# Patient Record
Sex: MALE | Race: WHITE | NOT HISPANIC OR LATINO | Employment: FULL TIME | URBAN - METROPOLITAN AREA
[De-identification: names, ages, dates, MRNs, and addresses within clinical notes are randomized per-mention and may not be internally consistent; named-entity substitution may affect disease eponyms.]

---

## 2017-12-31 ENCOUNTER — HOSPITAL ENCOUNTER (EMERGENCY)
Facility: HOSPITAL | Age: 32
Discharge: HOME | End: 2017-12-31
Attending: EMERGENCY MEDICINE | Admitting: EMERGENCY MEDICINE

## 2017-12-31 ENCOUNTER — APPOINTMENT (OUTPATIENT)
Dept: GENERAL RADIOLOGY | Facility: HOSPITAL | Age: 32
End: 2017-12-31

## 2017-12-31 VITALS
HEIGHT: 70 IN | RESPIRATION RATE: 16 BRPM | OXYGEN SATURATION: 96 % | DIASTOLIC BLOOD PRESSURE: 86 MMHG | HEART RATE: 73 BPM | BODY MASS INDEX: 22.9 KG/M2 | WEIGHT: 160 LBS | SYSTOLIC BLOOD PRESSURE: 132 MMHG | TEMPERATURE: 98.3 F

## 2017-12-31 DIAGNOSIS — M54.31 SCIATIC PAIN, RIGHT: Primary | ICD-10-CM

## 2017-12-31 PROCEDURE — 99283 EMERGENCY DEPT VISIT LOW MDM: CPT

## 2017-12-31 PROCEDURE — 96374 THER/PROPH/DIAG INJ IV PUSH: CPT

## 2017-12-31 PROCEDURE — 25010000002 METHYLPREDNISOLONE PER 125 MG: Performed by: EMERGENCY MEDICINE

## 2017-12-31 PROCEDURE — 72110 X-RAY EXAM L-2 SPINE 4/>VWS: CPT

## 2017-12-31 PROCEDURE — 96375 TX/PRO/DX INJ NEW DRUG ADDON: CPT

## 2017-12-31 RX ORDER — PREDNISONE 20 MG/1
20 TABLET ORAL 2 TIMES DAILY
Qty: 8 TABLET | Refills: 0 | Status: SHIPPED | OUTPATIENT
Start: 2017-12-31 | End: 2020-03-03

## 2017-12-31 RX ORDER — LORAZEPAM 1 MG/1
0.5 TABLET ORAL EVERY 6 HOURS PRN
Status: DISCONTINUED | OUTPATIENT
Start: 2017-12-31 | End: 2017-12-31 | Stop reason: HOSPADM

## 2017-12-31 RX ORDER — CYCLOBENZAPRINE HCL 10 MG
10 TABLET ORAL 3 TIMES DAILY PRN
Qty: 21 TABLET | Refills: 0 | Status: SHIPPED | OUTPATIENT
Start: 2017-12-31 | End: 2020-03-03

## 2017-12-31 RX ORDER — HYDROMORPHONE HYDROCHLORIDE 1 MG/ML
0.5 INJECTION, SOLUTION INTRAMUSCULAR; INTRAVENOUS; SUBCUTANEOUS ONCE
Status: COMPLETED | OUTPATIENT
Start: 2017-12-31 | End: 2017-12-31

## 2017-12-31 RX ORDER — METHYLPREDNISOLONE SODIUM SUCCINATE 125 MG/2ML
125 INJECTION, POWDER, LYOPHILIZED, FOR SOLUTION INTRAMUSCULAR; INTRAVENOUS ONCE
Status: COMPLETED | OUTPATIENT
Start: 2017-12-31 | End: 2017-12-31

## 2017-12-31 RX ORDER — SODIUM CHLORIDE 0.9 % (FLUSH) 0.9 %
10 SYRINGE (ML) INJECTION AS NEEDED
Status: DISCONTINUED | OUTPATIENT
Start: 2017-12-31 | End: 2017-12-31 | Stop reason: HOSPADM

## 2017-12-31 RX ADMIN — METHYLPREDNISOLONE SODIUM SUCCINATE 125 MG: 125 INJECTION, POWDER, FOR SOLUTION INTRAMUSCULAR; INTRAVENOUS at 04:55

## 2017-12-31 RX ADMIN — HYDROMORPHONE HYDROCHLORIDE 0.5 MG: 10 INJECTION INTRAMUSCULAR; INTRAVENOUS; SUBCUTANEOUS at 04:56

## 2017-12-31 RX ADMIN — LORAZEPAM 0.5 MG: 1 TABLET ORAL at 04:56

## 2018-01-01 ENCOUNTER — HOSPITAL ENCOUNTER (EMERGENCY)
Facility: HOSPITAL | Age: 33
Discharge: HOME | End: 2018-01-01
Attending: EMERGENCY MEDICINE | Admitting: EMERGENCY MEDICINE

## 2018-01-01 ENCOUNTER — APPOINTMENT (OUTPATIENT)
Dept: CT IMAGING | Facility: HOSPITAL | Age: 33
End: 2018-01-01

## 2018-01-01 VITALS
HEART RATE: 96 BPM | WEIGHT: 160 LBS | DIASTOLIC BLOOD PRESSURE: 90 MMHG | HEIGHT: 70 IN | BODY MASS INDEX: 22.9 KG/M2 | OXYGEN SATURATION: 99 % | SYSTOLIC BLOOD PRESSURE: 147 MMHG | RESPIRATION RATE: 16 BRPM | TEMPERATURE: 98.3 F

## 2018-01-01 DIAGNOSIS — M51.26 HERNIATED LUMBAR INTERVERTEBRAL DISC: Primary | ICD-10-CM

## 2018-01-01 PROCEDURE — 25010000002 HYDROMORPHONE PER 4 MG: Performed by: EMERGENCY MEDICINE

## 2018-01-01 PROCEDURE — 72131 CT LUMBAR SPINE W/O DYE: CPT

## 2018-01-01 PROCEDURE — 96372 THER/PROPH/DIAG INJ SC/IM: CPT

## 2018-01-01 PROCEDURE — 25010000002 PROMETHAZINE PER 50 MG: Performed by: EMERGENCY MEDICINE

## 2018-01-01 PROCEDURE — 99283 EMERGENCY DEPT VISIT LOW MDM: CPT

## 2018-01-01 RX ORDER — OXYCODONE AND ACETAMINOPHEN 10; 325 MG/1; MG/1
1 TABLET ORAL EVERY 4 HOURS PRN
Qty: 24 TABLET | Refills: 0 | Status: SHIPPED | OUTPATIENT
Start: 2018-01-01 | End: 2020-03-03

## 2018-01-01 RX ORDER — PROMETHAZINE HYDROCHLORIDE 25 MG/ML
25 INJECTION, SOLUTION INTRAMUSCULAR; INTRAVENOUS ONCE
Status: COMPLETED | OUTPATIENT
Start: 2018-01-01 | End: 2018-01-01

## 2018-01-01 RX ADMIN — HYDROMORPHONE HYDROCHLORIDE 2 MG: 2 INJECTION INTRAMUSCULAR; INTRAVENOUS; SUBCUTANEOUS at 15:32

## 2018-01-01 RX ADMIN — PROMETHAZINE HYDROCHLORIDE 25 MG: 25 INJECTION INTRAMUSCULAR; INTRAVENOUS at 15:32

## 2018-01-01 NOTE — ED TRIAGE NOTES
"Pt states that he has had the pain for a month and was seeing a chiropractor which was helping but he re-injured it this weekend. States he was seen here and diagnosed with sciatica. Complains that the pain has gotten worse. States \"I was reading up on it and I just don't think it's sciatica. It hurts too bad.\" Pt in NAD at time of triage, skin is PWD, and breathing is even and unlabored.   "

## 2018-01-01 NOTE — ED PROVIDER NOTES
" EMERGENCY DEPARTMENT ENCOUNTER    CHIEF COMPLAINT  Chief Complaint: Leg Pain  History given by: Patient  History limited by: none  Room Number: 52/52  PMD: No Known Provider      HPI:  Pt is a 32 y.o. male who presents complaining of right buttock that radiates down right leg, concentrates in right calf, and numbs foot. Pt has hx of sciatic pain but states \"this feels worse\". Pt states he is unable to get comfortable. Pt visited ED 2 days ago an was given Prednisone and Toradol shot, but returned to ED because pain had not improved.     Duration:  2 days  Onset: gradual  Timing: constant  Location: right buttock  Radiation: right leg  Quality: pain  Intensity/Severity: mild  Progression: unchanged  Associated Symptoms: none  Aggravating Factors: movement  Alleviating Factors: none  Previous Episodes: Pt has hx of sciatic pain.  Treatment before arrival: Pt was given Prednisone and Toradol 2 days ago, to no effect.     PAST MEDICAL HISTORY  Active Ambulatory Problems     Diagnosis Date Noted   • No Active Ambulatory Problems     Resolved Ambulatory Problems     Diagnosis Date Noted   • No Resolved Ambulatory Problems     No Additional Past Medical History       PAST SURGICAL HISTORY  Past Surgical History:   Procedure Laterality Date   • ENDOMETRIAL BIOPSY         FAMILY HISTORY  History reviewed. No pertinent family history.    SOCIAL HISTORY  Social History     Social History   • Marital status: Single     Spouse name: N/A   • Number of children: N/A   • Years of education: N/A     Occupational History   • Not on file.     Social History Main Topics   • Smoking status: Never Smoker   • Smokeless tobacco: Current User     Types: Chew   • Alcohol use Yes   • Drug use: No   • Sexual activity: Defer     Other Topics Concern   • Not on file     Social History Narrative   • No narrative on file       ALLERGIES  Shellfish-derived products    REVIEW OF SYSTEMS  Review of Systems   Constitutional: Negative for activity " change, appetite change and fever.   HENT: Negative for congestion and sore throat.    Eyes: Negative.    Respiratory: Negative for cough and shortness of breath.    Cardiovascular: Negative for chest pain and leg swelling.   Gastrointestinal: Negative for abdominal pain, diarrhea and vomiting.   Endocrine: Negative.    Genitourinary: Negative for decreased urine volume and dysuria.   Musculoskeletal: Positive for myalgias (Right buttock and leg). Negative for neck pain.   Skin: Negative for rash and wound.   Allergic/Immunologic: Negative.    Neurological: Negative for weakness, numbness and headaches.   Hematological: Negative.    Psychiatric/Behavioral: Negative.    All other systems reviewed and are negative.      PHYSICAL EXAM  ED Triage Vitals   Temp Heart Rate Resp BP SpO2   01/01/18 1252 01/01/18 1252 01/01/18 1252 01/01/18 1319 01/01/18 1252   98.3 °F (36.8 °C) 111 16 147/90 96 %      Temp src Heart Rate Source Patient Position BP Location FiO2 (%)   01/01/18 1252 01/01/18 1252 01/01/18 1319 -- --   Tympanic Monitor Standing         Physical Exam   Constitutional: He is oriented to person, place, and time and well-developed, well-nourished, and in no distress.   HENT:   Head: Normocephalic and atraumatic.   Eyes: EOM are normal. Pupils are equal, round, and reactive to light.   Neck: Normal range of motion. Neck supple.   Cardiovascular: Normal rate, regular rhythm and normal heart sounds.    Pulmonary/Chest: Effort normal and breath sounds normal. No respiratory distress.   Abdominal: Soft. There is no tenderness. There is no rebound and no guarding.   Musculoskeletal: Normal range of motion. He exhibits no edema.   Right buttock pain and leg, but pt is able to stand and ambulate. Pt has good strength and is neurovascularly intact in right leg   Neurological: He is alert and oriented to person, place, and time. He has normal sensation and normal strength.   Skin: Skin is warm and dry.   Psychiatric: Mood  and affect normal.   Nursing note and vitals reviewed.      LAB RESULTS  Lab Results (last 24 hours)     ** No results found for the last 24 hours. **          I ordered the above labs and reviewed the results    RADIOLOGY  CT Lumbar Spine Without Contrast   Preliminary Result   1. 1.9 x 1.2 x 1.6 cm soft tissue focus to the right of the midline at   L5-S1. Correlation with an MRI of the lumbar spine is recommended.   2. Mild L5-S1 degenerative disc disease.       Radiation dose reduction techniques were utilized, including automated   exposure control and exposure modulation based on body size.                   I ordered the above noted radiological studies. Interpreted by radiologist. Discussed with radiologist (Ruben). Reviewed by me in PACS.     Procedures      PROGRESS AND CONSULTS  ED Course     1516  CT Lumbar Spine ordered.  Dilaudid and Phenergan ordered for pain management.     1621  Call placed to Neurosurgery.     1640  Discussed pt's case with Dr. Mcdaniel (neurosurgery) who agreed with plan for steroids, rest, and pain control as well as out paitent follow up with PCP to get MRIs prior to meeting with him    1645  Pt rechecked and resting comfortably since pain medication. Discussed result of CT scan as well as plan for discharge with steroids and pain control to follow up with PCP for MRI as well as Dr. Mcdaniel outpatient.       MEDICAL DECISION MAKING  Results were reviewed/discussed with the patient and they were also made aware of online access. Pt also made aware that some labs, such as cultures, will not be resulted during ER visit and follow up with PMD is necessary.     MDM  Number of Diagnoses or Management Options     Amount and/or Complexity of Data Reviewed  Tests in the radiology section of CPT®: ordered and reviewed (CT Lumbar Spine - Acute herniated disk, R L5 - S1)  Discuss the patient with other providers: yes (Dr. Mcdaniel (neurosurgery))           DIAGNOSIS  Final diagnoses:   Herniated lumbar  intervertebral disc L5/S1       DISPOSITION  DISCHARGE    Patient discharged in stable condition.    Reviewed implications of results, diagnosis, meds, responsibility to follow up, warning signs and symptoms of possible worsening, potential complications and reasons to return to ER.    Patient/Family voiced understanding of above instructions.    Discussed plan for discharge, as there is no emergent indication for admission.  Pt/family is agreeable and understands need for follow up and repeat testing.  Pt is aware that discharge does not mean that nothing is wrong but it indicates no emergency is present that requires admission and they must continue care with follow-up as given below or physician of their choice.     FOLLOW-UP  Anirudh Mcdaniel IV, MD  [address] [address]  [phone]    In 1 week           Medication List      New Prescriptions          oxyCODONE-acetaminophen  MG per tablet   Commonly known as:  PERCOCET   Take 1 tablet by mouth Every 4 (Four) Hours As Needed for Moderate Pain .             Latest Documented Vital Signs:  As of 5:22 PM  BP- 147/90 HR- 111 Temp- 98.3 °F (36.8 °C) (Tympanic) O2 sat- 96%    --  Documentation assistance provided by chantell Olguin for Dr. Ovalle.  Information recorded by the scribe was done at my direction and has been verified and validated by me.            Noa Olguin  01/01/18 6962       Jose Enrique Ovalle MD  01/01/18 9887

## 2018-01-01 NOTE — ED NOTES
Pt c/o right lower back pain that started 3weeks ago. Pt reports doing lifting on Saturday and now has increased pain. Pt was seen here for same on Sunday. Pain radiates down right leg. Reports numbness tingling to right foot. No loss of bowel or bladder function     Kaia Herr RN  01/01/18 4699

## 2018-01-04 ENCOUNTER — TRANSCRIBE ORDERS (OUTPATIENT)
Dept: LAB | Facility: HOSPITAL | Age: 33
End: 2018-01-04

## 2018-01-04 ENCOUNTER — HOSPITAL ENCOUNTER (OUTPATIENT)
Dept: CARDIOLOGY | Facility: HOSPITAL | Age: 33
Discharge: HOME | End: 2018-01-04
Attending: ORTHOPAEDIC SURGERY | Admitting: ORTHOPAEDIC SURGERY

## 2018-01-04 ENCOUNTER — HOSPITAL ENCOUNTER (OUTPATIENT)
Dept: GENERAL RADIOLOGY | Facility: HOSPITAL | Age: 33
Discharge: HOME | End: 2018-01-04
Attending: ORTHOPAEDIC SURGERY

## 2018-01-04 ENCOUNTER — LAB (OUTPATIENT)
Dept: LAB | Facility: HOSPITAL | Age: 33
End: 2018-01-04
Attending: ORTHOPAEDIC SURGERY

## 2018-01-04 DIAGNOSIS — Z01.818 PRE-OP TESTING: ICD-10-CM

## 2018-01-04 DIAGNOSIS — Z98.890 S/P LUMBAR MICRODISCECTOMY: ICD-10-CM

## 2018-01-04 DIAGNOSIS — Z01.818 PRE-OP TESTING: Primary | ICD-10-CM

## 2018-01-04 DIAGNOSIS — Z01.811 PRE-OP CHEST EXAM: ICD-10-CM

## 2018-01-04 LAB
ALBUMIN SERPL-MCNC: 4.4 G/DL (ref 3.5–5.2)
ALBUMIN/GLOB SERPL: 1.6 G/DL
ALP SERPL-CCNC: 54 U/L (ref 39–117)
ALT SERPL W P-5'-P-CCNC: 18 U/L (ref 1–41)
ANION GAP SERPL CALCULATED.3IONS-SCNC: 12.2 MMOL/L
APTT PPP: 26.2 SECONDS (ref 22.7–35.4)
AST SERPL-CCNC: 17 U/L (ref 1–40)
BASOPHILS # BLD AUTO: 0.04 10*3/MM3 (ref 0–0.2)
BASOPHILS NFR BLD AUTO: 0.4 % (ref 0–1.5)
BILIRUB SERPL-MCNC: 0.8 MG/DL (ref 0.1–1.2)
BUN BLD-MCNC: 18 MG/DL (ref 6–20)
BUN/CREAT SERPL: 17.5 (ref 7–25)
CALCIUM SPEC-SCNC: 9.2 MG/DL (ref 8.6–10.5)
CHLORIDE SERPL-SCNC: 99 MMOL/L (ref 98–107)
CO2 SERPL-SCNC: 27.8 MMOL/L (ref 22–29)
CREAT BLD-MCNC: 1.03 MG/DL (ref 0.76–1.27)
DEPRECATED RDW RBC AUTO: 40.3 FL (ref 37–54)
EOSINOPHIL # BLD AUTO: 0.18 10*3/MM3 (ref 0–0.7)
EOSINOPHIL NFR BLD AUTO: 1.7 % (ref 0.3–6.2)
ERYTHROCYTE [DISTWIDTH] IN BLOOD BY AUTOMATED COUNT: 11.9 % (ref 11.5–14.5)
GFR SERPL CREATININE-BSD FRML MDRD: 85 ML/MIN/1.73
GLOBULIN UR ELPH-MCNC: 2.8 GM/DL
GLUCOSE BLD-MCNC: 88 MG/DL (ref 65–99)
HCT VFR BLD AUTO: 45.4 % (ref 40.4–52.2)
HGB BLD-MCNC: 15.7 G/DL (ref 13.7–17.6)
IMM GRANULOCYTES # BLD: 0.02 10*3/MM3 (ref 0–0.03)
IMM GRANULOCYTES NFR BLD: 0.2 % (ref 0–0.5)
INR PPP: 1.01 (ref 0.9–1.1)
LYMPHOCYTES # BLD AUTO: 3.41 10*3/MM3 (ref 0.9–4.8)
LYMPHOCYTES NFR BLD AUTO: 32.5 % (ref 19.6–45.3)
MCH RBC QN AUTO: 32.6 PG (ref 27–32.7)
MCHC RBC AUTO-ENTMCNC: 34.6 G/DL (ref 32.6–36.4)
MCV RBC AUTO: 94.2 FL (ref 79.8–96.2)
MONOCYTES # BLD AUTO: 0.64 10*3/MM3 (ref 0.2–1.2)
MONOCYTES NFR BLD AUTO: 6.1 % (ref 5–12)
NEUTROPHILS # BLD AUTO: 6.2 10*3/MM3 (ref 1.9–8.1)
NEUTROPHILS NFR BLD AUTO: 59.1 % (ref 42.7–76)
PLATELET # BLD AUTO: 299 10*3/MM3 (ref 140–500)
PMV BLD AUTO: 10.2 FL (ref 6–12)
POTASSIUM BLD-SCNC: 3.8 MMOL/L (ref 3.5–5.2)
PROT SERPL-MCNC: 7.2 G/DL (ref 6–8.5)
PROTHROMBIN TIME: 12.9 SECONDS (ref 11.7–14.2)
RBC # BLD AUTO: 4.82 10*6/MM3 (ref 4.6–6)
SODIUM BLD-SCNC: 139 MMOL/L (ref 136–145)
WBC NRBC COR # BLD: 10.49 10*3/MM3 (ref 4.5–10.7)

## 2018-01-04 PROCEDURE — 85610 PROTHROMBIN TIME: CPT

## 2018-01-04 PROCEDURE — 85730 THROMBOPLASTIN TIME PARTIAL: CPT

## 2018-01-04 PROCEDURE — 80053 COMPREHEN METABOLIC PANEL: CPT

## 2018-01-04 PROCEDURE — 36415 COLL VENOUS BLD VENIPUNCTURE: CPT

## 2018-01-04 PROCEDURE — 85025 COMPLETE CBC W/AUTO DIFF WBC: CPT

## 2018-01-04 PROCEDURE — 71046 X-RAY EXAM CHEST 2 VIEWS: CPT

## 2018-01-04 PROCEDURE — 93010 ELECTROCARDIOGRAM REPORT: CPT | Performed by: INTERNAL MEDICINE

## 2018-01-04 PROCEDURE — 93005 ELECTROCARDIOGRAM TRACING: CPT | Performed by: ORTHOPAEDIC SURGERY

## 2018-01-11 ENCOUNTER — TELEPHONE (OUTPATIENT)
Dept: NEUROSURGERY | Facility: CLINIC | Age: 33
End: 2018-01-11

## 2018-01-11 NOTE — TELEPHONE ENCOUNTER
----- Message from Umu Perez sent at 1/11/2018  9:12 AM EST -----  Regarding: FW: appt      ----- Message -----     From: TONY Dykes     Sent: 1/10/2018   3:13 PM       To: Umu Perez  Subject: appt                                             He has new pt appt with me on 1/17, but it looks like he is scheduled to have surgery with Dr. Cooper soon as there are preop tests in the system. Please find out if we need to cancel his appt.

## 2018-01-11 NOTE — TELEPHONE ENCOUNTER
I called and LVM for patient to call office back. We just need to confirm if he wants to cancel his appt due to being scheduled for a surgery with another provider for same issue.    We do not need to let referring know (as to being referred in by ER).

## 2019-04-30 ENCOUNTER — OFFICE VISIT (OUTPATIENT)
Dept: FAMILY MEDICINE CLINIC | Facility: CLINIC | Age: 34
End: 2019-04-30

## 2019-04-30 VITALS
BODY MASS INDEX: 23.82 KG/M2 | WEIGHT: 166.4 LBS | TEMPERATURE: 98.7 F | DIASTOLIC BLOOD PRESSURE: 90 MMHG | OXYGEN SATURATION: 99 % | SYSTOLIC BLOOD PRESSURE: 120 MMHG | HEIGHT: 70 IN | HEART RATE: 84 BPM | RESPIRATION RATE: 18 BRPM

## 2019-04-30 DIAGNOSIS — F17.220 CHEWING TOBACCO NICOTINE DEPENDENCE WITHOUT COMPLICATION: ICD-10-CM

## 2019-04-30 DIAGNOSIS — M79.2 NEUROPATHIC PAIN OF RIGHT LOWER EXTREMITY: Primary | ICD-10-CM

## 2019-04-30 DIAGNOSIS — H02.9 EYELID ABNORMALITY: ICD-10-CM

## 2019-04-30 PROBLEM — J30.9 ALLERGIC RHINITIS: Status: ACTIVE | Noted: 2019-04-30

## 2019-04-30 PROCEDURE — 99214 OFFICE O/P EST MOD 30 MIN: CPT | Performed by: FAMILY MEDICINE

## 2019-04-30 PROCEDURE — 99406 BEHAV CHNG SMOKING 3-10 MIN: CPT | Performed by: FAMILY MEDICINE

## 2019-04-30 RX ORDER — BUPROPION HYDROCHLORIDE 150 MG/1
150 TABLET, EXTENDED RELEASE ORAL DAILY
Refills: 3 | COMMUNITY
Start: 2019-04-01 | End: 2019-04-30 | Stop reason: SINTOL

## 2019-04-30 RX ORDER — BETAMETHASONE DIPROPIONATE 0.5 MG/G
OINTMENT TOPICAL
Refills: 1 | COMMUNITY
Start: 2019-02-22 | End: 2019-04-30

## 2019-04-30 RX ORDER — VITAMIN B COMPLEX
CAPSULE ORAL DAILY
COMMUNITY
End: 2020-07-14

## 2019-04-30 RX ORDER — CETIRIZINE HYDROCHLORIDE 10 MG/1
10 TABLET ORAL DAILY
COMMUNITY

## 2019-04-30 NOTE — PROGRESS NOTES
"Chief Complaint   Patient presents with   • Establish Care     NP to [facility]    • Consult     opthal. referral    • Numbness     right outside of leg and foot, happened after back surgery     Previously seen by no one in 4 years. Pt is new to me, problems are new to me.      Subjective   Dylon Hedrick is a 34 y.o. male.     History of Present Illness   Lower leg and foot still with numbness   He had back surgery. Dr. Cooper, 1/2018. Surgery in [place], surgical center.   Balance is off, no falls, just with thte right side feels like he is not as balances. Feels like his leg is \"asleep\" but that is constant.   He has tried massage, PT, stretching. Wondering about acupuncture.   He is not in pain.  He followed up about one year ago and was having numbness, told it will return and still has not. Feels there is no change. No back pain occasional tightness depending on activity level.    No PCP in 4 years.  Moved back to [place] after going to [place] for school.     Skin tag on eyelid  Discussed with derm, [facility]. [facility] [place].   Recommended ophtho related to the area  Thought it was a scab but he said it is attached to the lid  Has been there for about one year.     He has bupropion related to smoking cessation but did not tolerate it. Trying to quit dip. Started when he played baseball, 19 yo. Has tried patches and gum. He did it cold turkey. Had one hiccup but back on the cessation.      The following portions of the patient's history were reviewed and updated as appropriate: allergies, current medications, past family history, past medical history, past social history, past surgical history and problem list.    Review of Systems   Constitutional: Negative for activity change, appetite change and unexpected weight change.   Respiratory: Negative for shortness of breath.    Cardiovascular: Negative for chest pain and leg swelling.   Gastrointestinal: Negative for blood in stool, " "constipation and diarrhea.       Vitals:    04/30/19 1519   BP: 120/90   BP Location: Left arm   Patient Position: Sitting   Cuff Size: Adult   Pulse: 84   Resp: 18   Temp: 98.7 °F (37.1 °C)   TempSrc: Oral   SpO2: 99%   Weight: 75.5 kg (166 lb 6.4 oz)   Height: 177.8 cm (70\")       Objective   Physical Exam   Constitutional: He is oriented to person, place, and time. He appears well-nourished. No distress.   HENT:   Right Ear: External ear normal.   Left Ear: External ear normal.   Nose: Nose normal.   Mouth/Throat: Oropharynx is clear and moist. No oropharyngeal exudate.   Bilateral ear canals and tympanic membranes appear normal.   Eyes: Conjunctivae and EOM are normal. Right eye exhibits no discharge. Left eye exhibits no discharge. No scleral icterus.   Neck: Neck supple. No thyromegaly present.   Cardiovascular: Normal rate, regular rhythm, normal heart sounds and intact distal pulses. Exam reveals no gallop and no friction rub.   No murmur heard.  Pulmonary/Chest: Effort normal and breath sounds normal. No respiratory distress. He has no wheezes. He has no rales. He exhibits no tenderness.   Abdominal: Soft. Bowel sounds are normal. He exhibits no distension and no mass. There is no tenderness. There is no guarding.   Musculoskeletal: He exhibits no edema or deformity.   Lymphadenopathy:     He has no cervical adenopathy.   Neurological: He is alert and oriented to person, place, and time. He exhibits normal muscle tone. Coordination normal.   Skin: Skin is warm and dry.   Psychiatric: He has a normal mood and affect. His behavior is normal.   Vitals reviewed.      Assessment/Plan   Dylon was seen today for establish care, consult and numbness.    Diagnoses and all orders for this visit:    Neuropathic pain of right lower extremity    Eyelid abnormality    Chewing tobacco nicotine dependence without complication    Will send to [facility] after we get records from surgery, Dr. Cooper in [place], now with [facility]. Given " his persistent symptoms and never having follow up imaging pt is concerned for the continued numbness. When he had the herniated disc per his description of symptoms and report he gives of surgeon's comments sounds like a ruptured disc with extrusion transitioned to sequestered. He had severe back pain, had imaging, scheduled for surgery one week later and day prior to surgery back pain improved and had the numbness of the whole lateral aspect of the right leg down to the 5th digit on the right foot. Has been about 15 months since his surgery (1/8/18). Encouraged him to be active. He is going to start getting back into running slowly and see how it goes.      Eye lid skin lesion, needs referral. Derm counseled regarding concerning lesions of the eyelid, rare but needed closer look, will refer. He does have two separate lower lid papular lesions, medial lesion is hyperpigmented and lateral lesion is almost clear/cystic in appearance. They are 1-2 mm, do not interfere with lid closure. No pain. No abnormal changes like easy bleeding or irritating/itching.    The patient was counseled on the short-term and long-term benefits of smoking cessation. 5 minutes was spent discussing methods used to increase the likelihood of successfully quitting tobacco use including oral medications, nicotine replacement, choosing a quit date, developing new habits.  Working on tobacco cessation but he has used again. He has already cut back significantly. He has not tolerated medications and NRT to assist him so working on his own. Motivated by upcoming wedding and wanting to be more healthy and in control. Feels the habit dictates what he does. He has been seen by dentist and overall has healthy gingival tissues. On exam no teeth stain, gingival recession or discoloration. Healthy appearing oral mucosa. Encouraged to continue.

## 2019-05-02 ENCOUNTER — OFFICE VISIT (OUTPATIENT)
Dept: FAMILY MEDICINE CLINIC | Facility: CLINIC | Age: 34
End: 2019-05-02

## 2019-05-02 VITALS
HEIGHT: 70 IN | WEIGHT: 162.6 LBS | TEMPERATURE: 98.4 F | RESPIRATION RATE: 19 BRPM | HEART RATE: 89 BPM | OXYGEN SATURATION: 98 % | BODY MASS INDEX: 23.28 KG/M2 | SYSTOLIC BLOOD PRESSURE: 116 MMHG | DIASTOLIC BLOOD PRESSURE: 78 MMHG

## 2019-05-02 DIAGNOSIS — J02.9 SORE THROAT: Primary | ICD-10-CM

## 2019-05-02 DIAGNOSIS — B34.9 VIRAL ILLNESS: ICD-10-CM

## 2019-05-02 LAB
EXPIRATION DATE: NORMAL
INTERNAL CONTROL: NORMAL
Lab: NORMAL
S PYO AG THROAT QL: NEGATIVE

## 2019-05-02 PROCEDURE — 99213 OFFICE O/P EST LOW 20 MIN: CPT | Performed by: FAMILY MEDICINE

## 2019-05-02 PROCEDURE — 87880 STREP A ASSAY W/OPTIC: CPT | Performed by: FAMILY MEDICINE

## 2019-05-02 RX ORDER — IPRATROPIUM BROMIDE 42 UG/1
2 SPRAY, METERED NASAL 4 TIMES DAILY
Qty: 15 ML | Refills: 0 | Status: SHIPPED | OUTPATIENT
Start: 2019-05-02 | End: 2020-03-03

## 2019-05-02 NOTE — PROGRESS NOTES
"Chief Complaint   Patient presents with   • Sore Throat     2 days        Subjective   Dylon Hedrick is a 34 y.o. male.     History of Present Illness   Sore throat  Feels like swallowing razor blades. Some cough but mild. Productive of small mucous.  Woke up yesterday morning with mild sore throat.  He is been pushing the fluids and did extra vitamin C.  Over last night into this morning he was awakened with a sore throat pain.  Very painful when he swallows.  He continues to push the fluids and has menthol lozenges for relief.  His fiancée woke up sick with sore throat today as well.  Wondering what he can do for the pain concern for strep throat.    The following portions of the patient's history were reviewed and updated as appropriate: allergies, current medications and problem list.    Review of Systems   HENT: Positive for sore throat.    Respiratory: Positive for cough.    Genitourinary: Negative.        Vitals:    05/02/19 1350   BP: 116/78   BP Location: Right arm   Patient Position: Sitting   Cuff Size: Adult   Pulse: 89   Resp: 19   Temp: 98.4 °F (36.9 °C)   TempSrc: Oral   SpO2: 98%   Weight: 73.8 kg (162 lb 9.6 oz)   Height: 177.8 cm (70\")       Objective   Physical Exam   Constitutional: He is oriented to person, place, and time. He appears well-nourished. No distress.   HENT:   Mouth/Throat: Oropharynx is clear and moist. No oropharyngeal exudate.   Erythematous and edematous nasal turbinates.   Eyes: Conjunctivae are normal. Right eye exhibits no discharge. Left eye exhibits no discharge. No scleral icterus.   Cardiovascular: Normal rate, regular rhythm, normal heart sounds and intact distal pulses. Exam reveals no gallop and no friction rub.   No murmur heard.  Pulmonary/Chest: Effort normal and breath sounds normal. No respiratory distress. He has no wheezes.   Neurological: He is alert and oriented to person, place, and time.   Psychiatric: He has a normal mood and affect. His behavior is " normal.   Vitals reviewed.      Assessment/Plan   Dylon was seen today for sore throat.    Diagnoses and all orders for this visit:    Sore throat  -     POCT rapid strep A    Viral illness    Other orders  -     chlorpheniramine (CHLOR-TRIMETON) 2 MG/5ML syrup; Take 5 mL by mouth Every 4 (Four) Hours As Needed for Allergies.  -     ipratropium (ATROVENT) 0.06 % nasal spray; 2 sprays into the nostril(s) as directed by provider 4 (Four) Times a Day.      Patient with likely viral illness sore throat and mild cough.  We will do symptom relief at this time.  He can take ibuprofen with food and plenty of water for pain.  He can use the lozenges he is using will prescribe a spray and also do nasal ipratropium to reduce number of days of illness.  He should call if he is not improving.

## 2020-03-03 ENCOUNTER — OFFICE VISIT (OUTPATIENT)
Dept: GASTROENTEROLOGY | Facility: CLINIC | Age: 35
End: 2020-03-03

## 2020-03-03 VITALS
TEMPERATURE: 98.4 F | SYSTOLIC BLOOD PRESSURE: 112 MMHG | DIASTOLIC BLOOD PRESSURE: 68 MMHG | BODY MASS INDEX: 23.94 KG/M2 | WEIGHT: 167.2 LBS | HEIGHT: 70 IN

## 2020-03-03 DIAGNOSIS — R09.89 CHRONIC THROAT CLEARING: ICD-10-CM

## 2020-03-03 DIAGNOSIS — R13.19 ESOPHAGEAL DYSPHAGIA: Primary | ICD-10-CM

## 2020-03-03 PROCEDURE — 99203 OFFICE O/P NEW LOW 30 MIN: CPT | Performed by: INTERNAL MEDICINE

## 2020-03-03 RX ORDER — FAMOTIDINE 40 MG/1
40 TABLET, FILM COATED ORAL DAILY
Qty: 30 TABLET | Refills: 3 | Status: SHIPPED | OUTPATIENT
Start: 2020-03-03 | End: 2020-06-02 | Stop reason: HOSPADM

## 2020-03-03 NOTE — PROGRESS NOTES
Subjective   Chief Complaint   Patient presents with   • Difficulty Swallowing   • Heartburn       Adal Hedrick is a  34 y.o. male here for an initial visit for heartburn and difficulty swallowing.  These problems are new to me.    C/o constant throat clearing - loses voice quickly - recurrent laryngitis.  Occasional heartburn.  Reports intermittent difficulty swallowing dense foods - carrots, steaks etc.  This has been going on for years.  Weight stable.  Swallowing issues are intermittent.  No chest pain.  He has seasonal allergies and has sinus issues.  Throat clearing seems to worsen after lunch.  He reports a negative ENT work-up.      Answers for HPI/ROS submitted by the patient on 2/25/2020   What is the primary reason for your visit?: Other  Please describe your symptoms.: Constant hoarseness of voice for last 5 plus years. Breathy forceful voice and constant clearing of throat. Trouble swallowing certain foods that seem to get stuck in esophagus.  Have you had these symptoms before?: Yes  How long have you been having these symptoms?: Other  Please describe any probable cause for these symptoms. : Maybe chewing tobacco or exposure to certain chemicals [place] golf [place].       HPI  History reviewed. No pertinent past medical history.  Past Surgical History:   Procedure Laterality Date   • BACK SURGERY     • BACK SURGERY  01/2018   • ENDOMETRIAL BIOPSY     • MOLE REMOVAL         Current Outpatient Medications:   •  cetirizine (zyrTEC) 10 MG tablet, Take 10 mg by mouth Daily., Disp: , Rfl:   •  B Complex Vitamins (VITAMIN B COMPLEX) capsule capsule, Take  by mouth Daily., Disp: , Rfl:   •  famotidine (PEPCID) 40 MG tablet, Take 1 tablet by mouth Daily., Disp: 30 tablet, Rfl: 3  •  ibuprofen (ADVIL,MOTRIN) 200 MG tablet, Take 200 mg by mouth Every 6 (Six) Hours As Needed for Mild Pain ., Disp: , Rfl:   PRN Meds:.  Allergies   Allergen Reactions   • Shellfish-Derived Products      Social History      Socioeconomic History   • Marital status:      Spouse name: Not on file   • Number of children: Not on file   • Years of education: Not on file   • Highest education level: Not on file   Tobacco Use   • Smoking status: Never Smoker   • Smokeless tobacco: Current User     Types: Chew   Substance and Sexual Activity   • Alcohol use: Yes     Frequency: 2-4 times a month     Binge frequency: Monthly   • Drug use: No   • Sexual activity: Defer   Social History Narrative    ** Merged History Encounter **          Family History   Problem Relation Age of Onset   • No Known Problems Mother    • No Known Problems Father    • No Known Problems Sister    • No Known Problems Brother    • Lung cancer Maternal Grandfather    • Pancreatic cancer Maternal Aunt    • Colon cancer Neg Hx    • Prostate cancer Neg Hx      Review of Systems   Constitutional: Negative for appetite change and unexpected weight change.   HENT: Positive for trouble swallowing and voice change.    Gastrointestinal: Negative for abdominal pain and blood in stool.   All other systems reviewed and are negative.    Vitals:    03/03/20 1604   BP: 112/68   Temp: 98.4 °F (36.9 °C)         03/03/20  1604   Weight: 75.8 kg (167 lb 3.2 oz)       Objective   Physical Exam   Constitutional: He appears well-developed and well-nourished.   HENT:   Head: Normocephalic and atraumatic.   Eyes: No scleral icterus.   Pulmonary/Chest: Effort normal.   Abdominal: Soft. He exhibits no distension.   Neurological: He is alert.   Skin: Skin is warm and dry.   Psychiatric: He has a normal mood and affect.     No radiology results for the last 7 days    Assessment/Plan   Diagnoses and all orders for this visit:    Esophageal dysphagia  -     Case Request; Standing  -     Case Request    Chronic throat clearing  -     Case Request; Standing  -     Case Request    Other orders  -     Follow Anesthesia Guidelines / Protocol; Future  -     Obtain Informed Consent; Future  -      Implement Anesthesia orders day of procedure.; Standing  -     Obtain informed consent; Standing  -     famotidine (PEPCID) 40 MG tablet; Take 1 tablet by mouth Daily.      Plan:  · Interim seem to worsen after lunch.  Recommend trial of H2 blocker prior to his noontime meal.  · We will plan for EGD for further evaluation of possible acid reflux and dysphagia.  Differential includes acid related complication such as inflammation or stricture versus eosinophilic esophagitis.  · Discussed diet modification to prevent food impaction

## 2020-04-01 ENCOUNTER — TELEPHONE (OUTPATIENT)
Dept: GASTROENTEROLOGY | Facility: CLINIC | Age: 35
End: 2020-04-01

## 2020-04-01 NOTE — TELEPHONE ENCOUNTER
Rec trial otc pepcid complete for now - if symptoms not we controlled we can try an alternative - pls update pt

## 2020-04-01 NOTE — TELEPHONE ENCOUNTER
Faxed request received from [facility] - states that famotidine 40 mg 1 tab po daily on back order.  Asking for alternative.    Call to [facility] @ [phone] and spoke with Pharmacist.  States above remains on back order.  Pepcid Complete would be available OTC, or Ranitidine/zantac if MD wishes to switch.     Message to Dr Bledsoe

## 2020-05-28 ENCOUNTER — TRANSCRIBE ORDERS (OUTPATIENT)
Dept: SLEEP MEDICINE | Facility: HOSPITAL | Age: 35
End: 2020-05-28

## 2020-05-28 DIAGNOSIS — Z01.818 OTHER SPECIFIED PRE-OPERATIVE EXAMINATION: Primary | ICD-10-CM

## 2020-05-30 ENCOUNTER — LAB (OUTPATIENT)
Dept: LAB | Facility: HOSPITAL | Age: 35
End: 2020-05-30

## 2020-05-30 DIAGNOSIS — Z01.818 OTHER SPECIFIED PRE-OPERATIVE EXAMINATION: ICD-10-CM

## 2020-05-30 PROCEDURE — U0004 COV-19 TEST NON-CDC HGH THRU: HCPCS

## 2020-06-01 LAB
REF LAB TEST METHOD: NORMAL
SARS-COV-2 RNA RESP QL NAA+PROBE: NOT DETECTED

## 2020-06-02 ENCOUNTER — ANESTHESIA EVENT (OUTPATIENT)
Dept: GASTROENTEROLOGY | Facility: HOSPITAL | Age: 35
End: 2020-06-02

## 2020-06-02 ENCOUNTER — HOSPITAL ENCOUNTER (OUTPATIENT)
Facility: HOSPITAL | Age: 35
Setting detail: HOSPITAL OUTPATIENT SURGERY
Discharge: HOME | End: 2020-06-02
Attending: INTERNAL MEDICINE | Admitting: INTERNAL MEDICINE

## 2020-06-02 ENCOUNTER — ANESTHESIA (OUTPATIENT)
Dept: GASTROENTEROLOGY | Facility: HOSPITAL | Age: 35
End: 2020-06-02

## 2020-06-02 VITALS
WEIGHT: 163.38 LBS | HEART RATE: 53 BPM | RESPIRATION RATE: 14 BRPM | DIASTOLIC BLOOD PRESSURE: 70 MMHG | OXYGEN SATURATION: 97 % | SYSTOLIC BLOOD PRESSURE: 113 MMHG | TEMPERATURE: 98.2 F | BODY MASS INDEX: 23.44 KG/M2

## 2020-06-02 DIAGNOSIS — R09.89 CHRONIC THROAT CLEARING: ICD-10-CM

## 2020-06-02 DIAGNOSIS — R13.19 ESOPHAGEAL DYSPHAGIA: ICD-10-CM

## 2020-06-02 PROCEDURE — 43239 EGD BIOPSY SINGLE/MULTIPLE: CPT | Performed by: INTERNAL MEDICINE

## 2020-06-02 PROCEDURE — 43249 ESOPH EGD DILATION <30 MM: CPT | Performed by: INTERNAL MEDICINE

## 2020-06-02 PROCEDURE — 88305 TISSUE EXAM BY PATHOLOGIST: CPT | Performed by: INTERNAL MEDICINE

## 2020-06-02 PROCEDURE — C1726 CATH, BAL DIL, NON-VASCULAR: HCPCS | Performed by: INTERNAL MEDICINE

## 2020-06-02 PROCEDURE — 25010000002 PROPOFOL 10 MG/ML EMULSION: Performed by: ANESTHESIOLOGY

## 2020-06-02 PROCEDURE — S0260 H&P FOR SURGERY: HCPCS | Performed by: INTERNAL MEDICINE

## 2020-06-02 RX ORDER — SODIUM CHLORIDE, SODIUM LACTATE, POTASSIUM CHLORIDE, CALCIUM CHLORIDE 600; 310; 30; 20 MG/100ML; MG/100ML; MG/100ML; MG/100ML
1000 INJECTION, SOLUTION INTRAVENOUS CONTINUOUS
Status: DISCONTINUED | OUTPATIENT
Start: 2020-06-02 | End: 2020-06-02 | Stop reason: HOSPADM

## 2020-06-02 RX ORDER — FLUTICASONE PROPIONATE 50 MCG
2 SPRAY, SUSPENSION (ML) NASAL DAILY
COMMUNITY
End: 2020-07-14

## 2020-06-02 RX ORDER — GLYCOPYRROLATE 0.2 MG/ML
INJECTION INTRAMUSCULAR; INTRAVENOUS AS NEEDED
Status: DISCONTINUED | OUTPATIENT
Start: 2020-06-02 | End: 2020-06-02 | Stop reason: SURG

## 2020-06-02 RX ORDER — PROPOFOL 10 MG/ML
VIAL (ML) INTRAVENOUS AS NEEDED
Status: DISCONTINUED | OUTPATIENT
Start: 2020-06-02 | End: 2020-06-02 | Stop reason: SURG

## 2020-06-02 RX ORDER — PANTOPRAZOLE SODIUM 20 MG/1
20 TABLET, DELAYED RELEASE ORAL
Qty: 60 TABLET | Refills: 5 | Status: SHIPPED | OUTPATIENT
Start: 2020-06-02 | End: 2020-06-09 | Stop reason: DRUGHIGH

## 2020-06-02 RX ORDER — PROPOFOL 10 MG/ML
VIAL (ML) INTRAVENOUS CONTINUOUS PRN
Status: DISCONTINUED | OUTPATIENT
Start: 2020-06-02 | End: 2020-06-02 | Stop reason: SURG

## 2020-06-02 RX ORDER — LIDOCAINE HYDROCHLORIDE 20 MG/ML
INJECTION, SOLUTION INFILTRATION; PERINEURAL AS NEEDED
Status: DISCONTINUED | OUTPATIENT
Start: 2020-06-02 | End: 2020-06-02 | Stop reason: SURG

## 2020-06-02 RX ADMIN — PROPOFOL 140 MG: 10 INJECTION, EMULSION INTRAVENOUS at 12:20

## 2020-06-02 RX ADMIN — SODIUM CHLORIDE, POTASSIUM CHLORIDE, SODIUM LACTATE AND CALCIUM CHLORIDE 1000 ML: 600; 310; 30; 20 INJECTION, SOLUTION INTRAVENOUS at 11:37

## 2020-06-02 RX ADMIN — PROPOFOL 160 MCG/KG/MIN: 10 INJECTION, EMULSION INTRAVENOUS at 12:20

## 2020-06-02 RX ADMIN — LIDOCAINE HYDROCHLORIDE 60 MG: 20 INJECTION, SOLUTION INFILTRATION; PERINEURAL at 12:20

## 2020-06-02 RX ADMIN — GLYCOPYRROLATE 0.2 MCG: 0.2 INJECTION INTRAMUSCULAR; INTRAVENOUS at 12:36

## 2020-06-02 NOTE — ANESTHESIA PREPROCEDURE EVALUATION
Anesthesia Evaluation     Patient summary reviewed and Nursing notes reviewed   NPO Solid Status: > 8 hours  NPO Liquid Status: > 8 hours           Airway   Mallampati: II  Neck ROM: full  No difficulty expected  Dental - normal exam     Pulmonary     breath sounds clear to auscultation  Cardiovascular     Rhythm: regular        Neuro/Psych  GI/Hepatic/Renal/Endo      Musculoskeletal     Abdominal    Substance History      OB/GYN          Other                        Anesthesia Plan    ASA 2     MAC     intravenous induction     Anesthetic plan, all risks, benefits, and alternatives have been provided, discussed and informed consent has been obtained with: patient.

## 2020-06-02 NOTE — H&P
[facility]  Pre Procedure History & Physical    Chief Complaint:   dysphagia    Subjective     HPI:   Adal Hedrick is a  34 y.o. male here for an initial visit for heartburn and difficulty swallowing.  These problems are new to me.     C/o constant throat clearing - loses voice quickly - recurrent laryngitis.  Occasional heartburn.  Reports intermittent difficulty swallowing dense foods - carrots, steaks etc.  This has been going on for years.  Weight stable.  Swallowing issues are intermittent.  No chest pain.  He has seasonal allergies and has sinus issues.  Throat clearing seems to worsen after lunch.  He reports a negative ENT work-up.     Pt requests to be checked for h pylori and celiac    Past Medical History:   History reviewed. No pertinent past medical history.    Past Surgical History:  Past Surgical History:   Procedure Laterality Date   • BACK SURGERY     • BACK SURGERY  01/2018   • ENDOMETRIAL BIOPSY     • MOLE REMOVAL         Family History:  Family History   Problem Relation Age of Onset   • No Known Problems Mother    • No Known Problems Father    • No Known Problems Sister    • No Known Problems Brother    • Lung cancer Maternal Grandfather    • Pancreatic cancer Maternal Aunt    • Colon cancer Neg Hx    • Prostate cancer Neg Hx        Social History:   reports that he has never smoked. His smokeless tobacco use includes chew. He reports that he drinks alcohol. He reports that he does not use drugs.    Medications:   Medications Prior to Admission   Medication Sig Dispense Refill Last Dose   • cetirizine (zyrTEC) 10 MG tablet Take 10 mg by mouth Daily.   Past Month at Unknown time   • fluticasone (FLONASE) 50 MCG/ACT nasal spray 2 sprays into the nostril(s) as directed by provider Daily.   6/1/2020 at Unknown time   • ibuprofen (ADVIL,MOTRIN) 200 MG tablet Take 200 mg by mouth Every 6 (Six) Hours As Needed for Mild Pain .   Past Week at Unknown time   • B Complex  Vitamins (VITAMIN B COMPLEX) capsule capsule Take  by mouth Daily.   More than a month at Unknown time   • famotidine (PEPCID) 40 MG tablet Take 1 tablet by mouth Daily. 30 tablet 3        Allergies:  Shellfish-derived products    ROS:    Pertinent items are noted in HPI, all other systems reviewed and negative     Objective     Blood pressure 113/72, pulse 53, temperature 98.2 °F (36.8 °C), temperature source Oral, resp. rate 12, weight 74.1 kg (163 lb 6 oz), SpO2 99 %.    Physical Exam   Constitutional: Pt is oriented to person, place, and time and well-developed, well-nourished, and in no distress.   Mouth/Throat: Oropharynx is clear and moist.   Neck: Normal range of motion.   Cardiovascular: Normal rate, regular rhythm   Pulmonary/Chest: Effort normal     Skin: Skin is warm and dry.   Psychiatric: Mood, memory, affect and judgment normal.     Assessment/Plan     Diagnosis:  dysphagia    Anticipated Surgical Procedure:  EGD with possible interventions    The risks, benefits, and alternatives of this procedure have been discussed with the patient or the responsible party- the patient understands and agrees to proceed.

## 2020-06-02 NOTE — ANESTHESIA POSTPROCEDURE EVALUATION
Patient: Adal Hedrick    Procedure Summary     Date:  06/02/20 Room / Location:   [facility] /  [facility]    Anesthesia Start:  1217 Anesthesia Stop:  1241    Procedure:  ESOPHAGOGASTRODUODENOSCOPY WITH BX AND BALLOON DILATION 12, 13.5, 15mm (N/A Esophagus) Diagnosis:       Esophageal dysphagia      Chronic throat clearing      (Esophageal dysphagia [R13.10])      (Chronic throat clearing [R68.89])    Surgeon:  Britni Bledsoe MD Provider:  Giles Morales MD    Anesthesia Type:  MAC ASA Status:  2          Anesthesia Type: MAC    Vitals  Vitals Value Taken Time   /62 6/2/2020 12:38 PM   Temp     Pulse     Resp     SpO2 97 % 6/2/2020 12:38 PM           Post Anesthesia Care and Evaluation    Patient location during evaluation: bedside  Patient participation: complete - patient participated  Level of consciousness: sleepy but conscious  Pain score: 0  Pain management: adequate  Airway patency: patent  Anesthetic complications: No anesthetic complications    Cardiovascular status: acceptable  Respiratory status: acceptable  Hydration status: acceptable    Comments: /62 (BP Location: Left arm, Patient Position: Lying)   Pulse 53   Temp 36.8 °C (98.2 °F) (Oral)   Resp 12   Wt 74.1 kg (163 lb 6 oz)   SpO2 97%   BMI 23.44 kg/m²

## 2020-06-03 ENCOUNTER — TELEPHONE (OUTPATIENT)
Dept: GASTROENTEROLOGY | Facility: CLINIC | Age: 35
End: 2020-06-03

## 2020-06-03 ENCOUNTER — PRIOR AUTHORIZATION (OUTPATIENT)
Dept: GASTROENTEROLOGY | Facility: CLINIC | Age: 35
End: 2020-06-03

## 2020-06-03 LAB
CYTO UR: NORMAL
LAB AP CASE REPORT: NORMAL
LAB AP DIAGNOSIS COMMENT: NORMAL
PATH REPORT.FINAL DX SPEC: NORMAL
PATH REPORT.GROSS SPEC: NORMAL

## 2020-06-03 NOTE — TELEPHONE ENCOUNTER
Gastric biopsies negative for H. pylori, duodenal biopsies negative for celiac.    Esophageal biopsies with some inflammation but more consistent with acid reflux type changes.  Recommend pantoprazole 20 mg twice daily.  Office follow-up in 6 weeks

## 2020-06-04 ENCOUNTER — TELEPHONE (OUTPATIENT)
Dept: GASTROENTEROLOGY | Facility: CLINIC | Age: 35
End: 2020-06-04

## 2020-06-04 NOTE — TELEPHONE ENCOUNTER
Call to pt.  Advise per Dr Bledsoe note.  Verb understanding.     Appt scheduled with JASON Lowry for 7/14 @ 3:30 pm.

## 2020-06-04 NOTE — TELEPHONE ENCOUNTER
----- Message from Adal Hedrick sent at 6/3/2020  5:36 PM EDT -----  Regarding: Prescription Question  Contact: [phone]  I have a question about [facility] resulted on 6/2/20 at 12:10 PM.    There was an issue with my prescription that dr stevenson wrote me. It needs a prior authorization for the pharmacy to fill the 60 day supply. They sent it back for authorization so hopefully it can be approved soon so I can fill the prescription. Thank you

## 2020-06-09 ENCOUNTER — TELEPHONE (OUTPATIENT)
Dept: GASTROENTEROLOGY | Facility: CLINIC | Age: 35
End: 2020-06-09

## 2020-06-09 RX ORDER — PANTOPRAZOLE SODIUM 40 MG/1
40 TABLET, DELAYED RELEASE ORAL DAILY
Qty: 30 TABLET | Refills: 5 | Status: SHIPPED | OUTPATIENT
Start: 2020-06-09

## 2020-06-09 NOTE — TELEPHONE ENCOUNTER
Please see Pantoprazole PA note.  It was denied yesterday.  Letter is in media  Per Krystin.           Pt's message forwarded to Dr Bledsoe.

## 2020-06-09 NOTE — TELEPHONE ENCOUNTER
----- Message from Adal Hedrick sent at 6/8/2020  5:36 PM EDT -----  Regarding: Prescription Question  Contact: [phone]  I received a phone call over the weekend that the prior authorization needed to fill my prescription has been DENIED. I'm really in need of receiving this prescription to help my GI issues of gerd and trouble swallowing. Please please help bc I just want relief from my symptoms already. Thanks

## 2020-07-14 ENCOUNTER — OFFICE VISIT (OUTPATIENT)
Dept: GASTROENTEROLOGY | Facility: CLINIC | Age: 35
End: 2020-07-14

## 2020-07-14 VITALS
WEIGHT: 166 LBS | HEIGHT: 70 IN | BODY MASS INDEX: 23.77 KG/M2 | TEMPERATURE: 98.9 F | SYSTOLIC BLOOD PRESSURE: 120 MMHG | DIASTOLIC BLOOD PRESSURE: 80 MMHG

## 2020-07-14 DIAGNOSIS — R13.10 DYSPHAGIA, UNSPECIFIED TYPE: ICD-10-CM

## 2020-07-14 DIAGNOSIS — K21.00 GASTROESOPHAGEAL REFLUX DISEASE WITH ESOPHAGITIS: Primary | ICD-10-CM

## 2020-07-14 DIAGNOSIS — K22.2 BENIGN ESOPHAGEAL STRICTURE: ICD-10-CM

## 2020-07-14 PROCEDURE — 99214 OFFICE O/P EST MOD 30 MIN: CPT | Performed by: NURSE PRACTITIONER

## 2020-07-14 NOTE — PROGRESS NOTES
Chief Complaint   Patient presents with   • Difficulty Swallowing     resolved since egd with dilation   • Diarrhea     occasional       Adal Hedrick is a  35 y.o. male here for a follow up visit for GERD.    HPI  35-year-old male presents today for follow-up visit for GERD.  He is a patient of Dr. Bledsoe.  He underwent EGD with dilation on 6/2/2020.  The scope did show some esophagitis with a benign-appearing esophageal stricture and gastritis.  Path was negative.  He was started on Protonix 40 mg once daily.  He is happy to report that his swallowing difficulties have completely resolved.  He is no longer having any reflux symptoms.  He still sometimes gets worse by the end of the day but admits he talks a lot at his job.  Otherwise he reports his bowels are moving well.  He denies any dysphagia, reflux, abdominal pain, nausea and vomiting, diarrhea, constipation, rectal bleeding or melena.  He admits his appetite is good and his weight is stable.  History reviewed. No pertinent past medical history.    Past Surgical History:   Procedure Laterality Date   • BACK SURGERY     • BACK SURGERY  01/2018   • ENDOMETRIAL BIOPSY     • ENDOSCOPY N/A 6/2/2020    Procedure: ESOPHAGOGASTRODUODENOSCOPY WITH BX AND BALLOON DILATION 12, 13.5, 15mm;  Surgeon: Britni Bledsoe MD;  Location: [facility];  Service: Gastroenterology;  Laterality: N/A;  DYSPHAGIA, HEARTBURN  --ESOPHAGEAL STRICTURE, ABNORMAL ESOPHAGEAL MUCOSA, GASTRIC ERYTHEMA    • MOLE REMOVAL         Scheduled Meds:    Continuous Infusions:  No current facility-administered medications for this visit.     PRN Meds:.    Allergies   Allergen Reactions   • Shellfish-Derived Products Anaphylaxis       Social History     Socioeconomic History   • Marital status:      Spouse name: Not on file   • Number of children: Not on file   • Years of education: Not on file   • Highest education level: Not on file   Tobacco Use   • Smoking status: Never Smoker    • Smokeless tobacco: Current User     Types: Chew   Substance and Sexual Activity   • Alcohol use: Yes     Frequency: 2-4 times a month     Binge frequency: Monthly     Comment: occasional   • Drug use: No   • Sexual activity: Defer   Social History Narrative    ** Merged History Encounter **            Family History   Problem Relation Age of Onset   • No Known Problems Mother    • No Known Problems Father    • No Known Problems Sister    • No Known Problems Brother    • Lung cancer Maternal Grandfather    • Pancreatic cancer Maternal Aunt    • Colon cancer Neg Hx    • Prostate cancer Neg Hx        Review of Systems   Constitutional: Negative for appetite change, chills, diaphoresis, fatigue, fever and unexpected weight change.   HENT: Negative for nosebleeds, postnasal drip, sore throat, trouble swallowing and voice change.    Respiratory: Negative for cough, choking, chest tightness, shortness of breath and wheezing.    Cardiovascular: Negative for chest pain, palpitations and leg swelling.   Gastrointestinal: Negative for abdominal distention, abdominal pain, anal bleeding, blood in stool, constipation, diarrhea, nausea, rectal pain and vomiting.   Endocrine: Negative for polydipsia, polyphagia and polyuria.   Musculoskeletal: Negative for gait problem.   Skin: Negative for rash and wound.   Allergic/Immunologic: Negative for food allergies.   Neurological: Negative for dizziness, speech difficulty and light-headedness.   Psychiatric/Behavioral: Negative for confusion, self-injury, sleep disturbance and suicidal ideas.       Vitals:    07/14/20 1504   BP: 120/80   Temp: 98.9 °F (37.2 °C)       Physical Exam   Constitutional: He is oriented to person, place, and time. He appears well-developed and well-nourished. He does not appear ill. No distress.   HENT:   Head: Normocephalic.   Eyes: Pupils are equal, round, and reactive to light.   Cardiovascular: Normal rate, regular rhythm and normal heart sounds.    Pulmonary/Chest: Effort normal and breath sounds normal.   Abdominal: Soft. Bowel sounds are normal. He exhibits no distension and no mass. There is no hepatosplenomegaly. There is no tenderness. There is no rebound and no guarding. No hernia.   Musculoskeletal: Normal range of motion.   Neurological: He is alert and oriented to person, place, and time.   Skin: Skin is warm and dry.   Psychiatric: He has a normal mood and affect. His speech is normal and behavior is normal. Judgment normal.       No radiology results for the last 7 days     Assessment and plan     1. Gastroesophageal reflux disease with esophagitis    2. Dysphagia, unspecified type    3. Benign esophageal stricture    Reviewed EGD results with him today.  Bowels are moving well.  No dysphagia noted.  Appetite is good.  Continue Protonix 40 mg once daily.  Continue GERD precautions.  Patient to call the office with any issues.  Patient to follow-up with me in 6 months.

## 2020-08-18 ENCOUNTER — TELEPHONE (OUTPATIENT)
Dept: GASTROENTEROLOGY | Facility: CLINIC | Age: 35
End: 2020-08-18

## 2020-08-18 NOTE — TELEPHONE ENCOUNTER
Called pt's [facility] and  Spoke with pharmacist Maite who advised that his pantoprazole does not need a pa that this was his insurance's copay. They suggested pt contact their insurance company.     Called pt and left vm for pt to call back.

## 2020-08-18 NOTE — TELEPHONE ENCOUNTER
----- Message from Adal Hedrick sent at 8/17/2020 11:34 AM EDT -----  Regarding: Prescription Question  Contact: [phone]  Hello,  I had an issue at the pharmacy yesterday trying to  my monthly heartburn medication. Last month it was $11 and this month they said it would be $87 so I'm concerned that theres an issue w prior authorization or something. I'm waiting to  the prescription until its resolved. Please help. Thanks

## 2020-08-20 NOTE — TELEPHONE ENCOUNTER
Called pt and advised what his pharmacist said. Also advised pt he can get on the [employer] website and find the medication at a lesser cost. Pt verb understanding.

## 2021-04-02 ENCOUNTER — IMMUNIZATION (OUTPATIENT)
Dept: VACCINE CLINIC | Facility: HOSPITAL | Age: 36
End: 2021-04-02

## 2021-04-02 PROCEDURE — 0001A: CPT | Performed by: OBSTETRICS & GYNECOLOGY

## 2021-04-02 PROCEDURE — 91300 HC SARSCOV02 VAC 30MCG/0.3ML IM: CPT | Performed by: OBSTETRICS & GYNECOLOGY

## 2021-04-23 ENCOUNTER — IMMUNIZATION (OUTPATIENT)
Dept: VACCINE CLINIC | Facility: HOSPITAL | Age: 36
End: 2021-04-23

## 2021-04-23 PROCEDURE — 0002A: CPT | Performed by: OBSTETRICS & GYNECOLOGY

## 2021-04-23 PROCEDURE — 91300 HC SARSCOV02 VAC 30MCG/0.3ML IM: CPT | Performed by: OBSTETRICS & GYNECOLOGY

## (undated) DEVICE — DEV INFL ALLIANCE2 SYS

## (undated) DEVICE — KT ORCA ORCAPOD DISP STRL

## (undated) DEVICE — LN SMPL CO2 SHTRM SD STREAM W/M LUER

## (undated) DEVICE — MSK ENDO PORT O2 POM ELITE CURAPLEX A/

## (undated) DEVICE — ADAPT CLN BIOGUARD AIR/H2O DISP

## (undated) DEVICE — SENSR O2 OXIMAX FNGR A/ 18IN NONSTR

## (undated) DEVICE — DILATOR CONTRL RADL 12/15MM 8CM BX5

## (undated) DEVICE — FRCP BX RADJAW4 NDL 2.8 240CM LG OG BX40

## (undated) DEVICE — TUBING, SUCTION, 1/4" X 10', STRAIGHT: Brand: MEDLINE

## (undated) DEVICE — BITEBLOCK OMNI BLOC